# Patient Record
Sex: FEMALE | Race: WHITE
[De-identification: names, ages, dates, MRNs, and addresses within clinical notes are randomized per-mention and may not be internally consistent; named-entity substitution may affect disease eponyms.]

---

## 2018-08-15 NOTE — ED
Complex/Multi-Sys Presentation





- HPI Summary


HPI Summary: 





 This is millicent Palumbo documenting for attending Yuliet Schmid MD. 





This patient is a 33 year old F BIBA to [facility] with a chief complaint of right 

eye peripheral blurry vision that began at 1930. 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 

works as a CNA and 15 minutes later she began having slurred speech, aphasia, 

and a thick tongue. All sx resolved after 45 minutes. The patient rates the 

pain 0/10 in severity. Patient reports light headedness. Patient denies HA and 

trouble ambulating. When these occurred she saw the nurse at her work and she 

was given something to raise her blood sugar. No similar sx in the past. LNMP 

was one month ago and should begin in 3 days. Pt believes she had possible low 

blood glucose but no hx of  DM. 








- History Of Current Complaint


Chief Complaint: EDAltMentalStatus


Time Seen by Provider: 08/15/18 21:30


Hx Obtained From: Patient


Onset/Duration: Lasting Minutes - 45, Resolved


Timing: Intermittent, Lasting: - 45 min


Severity Currently: None


Severity Initially: Mild


Associated Signs And Symptoms: Positive: Other - blurred vision and trouble 

speaking





- Allergies/Home Medications


Allergies/Adverse Reactions: 


 Allergies











Allergy/AdvReac Type Severity Reaction Status Date / Time


 


sulfamethoxazole Allergy  Hives Verified 08/15/18 21:39





[From Bactrim]     


 


trimethoprim [From Bactrim] Allergy  Hives Verified 08/15/18 21:39














PMH/Surg Hx/FS Hx/Imm Hx


Endocrine/Hematology History: 


   Denies: Hx Blood Transfusions, Hx Diabetes


Cardiovascular History: 


   Denies: Hx Auto Implanted Cardiovert Defib, Hx Cardiomegaly, Hx Deep Vein 

Thrombosis


Respiratory History: 


   Denies: Hx Chronic Obstructive Pulmonary Disease (COPD), Hx Pneumonia


 History: 


   Denies: Hx Benign Prostatic Hyperplasia


Sensory History: 


   Denies: Hx Legally Blind, Hx Deafness


Opthamlomology History: 


   Denies: Hx Legally Blind


Psychiatric History: 


   Denies: Hx Community Mental Health Tx


Infectious Disease History: Yes


Infectious Disease History: 


   Denies: Traveled Outside the [place] in Last 30 Days





- Family History


Known Family History: Positive: Diabetes





- Social History


Occupation: Employed Full-time


Alcohol Use: None


Substance Use Type: Reports: None


Smoking Status (MU): Never Smoked Tobacco





Review of Systems


Positive: Blurred Vision - right eye 


Positive: Other - "thick tongue" 


Neurological: Negative - trouble ambulating , Other - light headedness 


Positive: Slurred Speech - w/ aphasia .  Negative: Headache


All Other Systems Reviewed And Are Negative: Yes





Physical Exam





- Summary


Physical Exam Summary: 





VITAL SIGNS: Reviewed.


GENERAL:  Patient is a well-developed and nourished FEMALE who is lying 

comfortable in the stretcher. Patient is not in any acute respiratory distress.


HEAD AND FACE: No signs of trauma. No ecchymosis, hematomas or skull 

depressions. No sinus tenderness.


EYES: PERRLA, EOMI x 2, No injected conjunctiva, no nystagmus.


EARS: Hearing grossly intact. Ear canals and tympanic membranes are within 

normal limits.


MOUTH: Oropharynx within normal limits.


NECK: Supple, trachea is midline, no adenopathy, no JVD, no carotid bruit, no c-

spine tenderness, neck with full ROM.


CHEST: Symmetric, no tenderness at palpation


LUNGS: Clear to auscultation bilaterally. No wheezing or crackles.


CVS: Regular rate and rhythm, S1 and S2 present, no murmurs or gallops 

appreciated.


ABDOMEN: Soft, non-tender. No signs of distention. No rebound no guarding, and 

no masses palpated. Bowel sounds are normal.


EXTREMITIES: FROM in all major joints, no edema, no cyanosis or clubbing.


NEURO: Alert and oriented x 3. No acute neurological deficits. Speech is normal 

and follows commands.


SKIN: Dry and warm





Triage Information Reviewed: Yes


Vital Signs On Initial Exam: 


 Initial Vitals











Temp Pulse Resp BP Pulse Ox


 


 98.9 F   89   16   128/86   100 


 


 08/15/18 21:22  08/15/18 21:22  08/15/18 21:22  08/15/18 21:22  08/15/18 21:22











Vital Signs Reviewed: Yes





Diagnostics





- Vital Signs


 Vital Signs











  Temp Pulse Resp BP Pulse Ox


 


 08/15/18 21:55   94  16  119/69  100


 


 08/15/18 21:25   93  15  128/86  100


 


 08/15/18 21:23   96    100


 


 08/15/18 21:22  98.9 F  89  16  128/86  100














- Laboratory


Result Diagrams: 


 08/15/18 22:29





 08/15/18 22:29


Lab Statement: Any lab studies that have been ordered have been reviewed, and 

results considered in the medical decision making process.





- CT


  ** CTA head


CT Interpretation Completed By: Radiologist - normal CT head ED physician has 

reviewed this radiology report.





  ** CT head


CT Interpretation Completed By: Radiologist - normal CT head ED physician has 

reviewed this radiology report.





- EKG


  ** 22:28


Cardiac Rate: Tachycardia


EKG Rhythm: Sinus Tachycardia - at 102 BPM


EKG Interpretation: Normal axis. Normal interval. No ischemic changes 





Complex Multi-Symp Course/Dx


Assessment/Plan: This patient is a 33 year old F BIBA to [facility] with a chief 

complaint of right eye peripheral blurry vision that began at 1930. Pt was at 

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 

speech, aphasia, and a thick tongue. All sx resolved after 45 minutes. The 

patient rates the pain 0/10 in severity. Patient reports light headedness. 

Patient denies HA and trouble ambulating. When these occurred she saw the nurse 

at her work and she was given something to raise her blood sugar. No similar sx 

in the past. LNMP was one month ago and should begin in 3 days. Pt believes she 

had possible low blood glucose but no hx of  DM.  An EKG reveals sinus 

tachycardia.  CT Head reveals, per radiologist, normal CT head.  Test results 

with no significant abnormalities. UA obtained.  In the ED course the patient 

was given ASA.  We discussed patient care with Dr. Romero and they have 

accepted the patient for admission.  Patient will be admitted for a TIA.  The 

patient is agreeable with this plan.





- Diagnoses


Provider Diagnoses: 


 TIA (transient ischemic attack)








- Physician Notifications


Discussed Care Of Patient With: Marina Romero


Time Discussed With Above Provider: 12:55


Instructed by Provider To: Admit As Inpatient





Discharge





- Sign-Out/Discharge


Documenting (check all that apply): Patient Departure - admitted 





- Discharge Plan


Condition: Fair


Disposition: ADMITTED TO [facility]


Referrals: 


Tasia Jalloh MD [Primary Care Provider] - 





Attestation Statement


Scribe Attestation: 





This is millicent Palumbo documenting for attending Yuliet Schmid MD. 








User Type: Provider with Scribe


Provider Attestation: The documentation recorded by the scribe accurately 

reflects the service I personally performed and the decisions made by me.

## 2018-08-15 NOTE — UC
Dizzy HPI


HPI Summary: 


The patient is a 32 y/o F presenting to [facility] c/o blurred vision and slurred 

speech with dizziness and lightheadedness at approximately 19:45 tonight. She 

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 

sudden onset of blurred vision in her right eye. Her left eye was alright, but 

then she was going to talk to someone else she was working with and was unable 

to speak correctly. She did not have any other weakness or numbness, CP, or SOB

, but she felt anxious. Her symptoms only last minutes as her vision and speech 

are back to normal. She has not had previous episodes with these symptoms. She 

has hx of anxiety. She does not take any medications. 





- History Of Current Complaint


Stated Complaint: BLURRED SPEECH,VISION BLURRED


Time Seen by Provider: 08/15/18 20:34


Hx Obtained From: Patient


Onset/Duration: Sudden Onset, Lasting Minutes, Resolved


Timing: Minutes


Pain Scale Used: 0-10 Numeric


Character: Lightheaded, Dizzy


Aggravating Factor(s): Nothing


Alleviating Factor(s): Nothing


Associated Signs And Symptoms: Positive: Negative - weakness, numbness, Visual 

Changes - blurred vision.  Negative: Chest Pain, SOB





- Allergies/Home Medications


Allergies/Adverse Reactions: 


 Allergies











Allergy/AdvReac Type Severity Reaction Status Date / Time


 


No Known Allergies Allergy   Verified 08/15/18 20:43











Home Medications: 


 Home Medications





NK [No Home Medications Reported]  08/15/18 [History Confirmed 08/15/18]











PMH/Surg Hx/FS Hx/Imm Hx


Other Endocrine History: NEGATIVE: diabetes


Other Cardiovascular History: NEGATIVE: HTN


Psychological History: Anxiety





- Family History


Known Family History: Positive: Diabetes





Review of Systems


Eyes: Blurred Vision - right eye


Respiratory: Other - NEGATIVE: SOB


Cardiovascular: Other - NEGATIVE: CP


Neurological: Other - POSITIVE: slurred speech, dizzy, lightheaded; NEGATIVE: 

weakness, numbness


Psychological: Anxious


All Other Systems Reviewed And Are Negative: Yes





Physical Exam





- Summary


Physical Exam Summary: 


General: well-appearing, no pain distress


Skin: warm, color reflects adequate perfusion, dry


Head: normal


Eyes: EOMI, CLARK


ENT: normal


Neck: supple, nontender


Respiratory: CTA, breath sounds present


Cardiovascular: RRR


Abdomen: soft, nontender


Bowel: present


Musculoskeletal: normal, strength/ROM intact


Neurological: sensory/motor intact, A&O x3


Psychological: affect/mood appropriate


Triage Information Reviewed: Yes


Vital Signs Reviewed: Yes





Diagnostics





- EKG


EKG Comments: 


20:29


Cardiac Rate: NL - 96 BPM


Cardiac Rhythm: Sinus: Normal


Ectopy: None


ST Segment: Normal





Dizzy Course/Dx





- Course


Course Of Treatment: Medications reviewed. Allergies noted.  fs bs 91.  TIME OF 

ONSET OF RIGHT SIDED BLURRED VISION AND DIFFICULTY OF SPEECH 19:45.  DURATION 

OF SX 15-20 MINUTES.  NO NEUROLOGIC DEFICIT IN CLINIC.  TRANSFERED BY AMBULANCE 

TO EMERGENCY DEPARTMENT.





- Differential Dx/Diagnosis


Provider Diagnoses: TRANSIENT BLURRED VISION AND EXPRESSIVE APHASIA





Discharge





- Sign-Out/Discharge


Documenting (check all that apply): Patient Departure - Patient will be 

transferred to [facility].





- Discharge Plan


Condition: Stable


Disposition: TRANS HIGHER LVL OF CARE FAC


Referrals: 


Tasia Jalloh MD [Primary Care Provider] - 





- Billing Disposition and Condition


Condition: STABLE


Disposition: Trans Higher Lvl of Care Fac





Attestation Statement


Scribe Attestation: 


This is millicent Denny documenting for attending Dr. Suresh Alvarenga MD.


User Type: Provider with Scribe


Provider Attestation: The documentation recorded by the scribe accurately 

reflects the service I personally performed and the decisions made by me.

## 2018-08-16 NOTE — HP
CC:  Dr. Tasia Jalloh*

 

HISTORY AND PHYSICAL:

 

DATE OF ADMISSION:  08/16/18

 

PRIMARY CARE PROVIDER:  To be established with Dr. Tasia Jalloh.

 

CHIEF COMPLAINT:  Difficulty with speech.

 

HISTORY OF PRESENT ILLNESS:  Ms. Hill is a 33-year-old female who has a 
history of morbid obesity with a BMI of greater than 50, who presents to the 
emergency room with complaints of blurry vision and difficult speech.  The 
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 
to bed when she noticed that she had blurriness out of the corner of her right 
eye.  She states the left eye was completely fine when she closed her right 
eye.  The patient states that she had like a crescent-shaped area of blurriness 
in the peripheral vision.  She states that she had LASIK surgery in December 2017 and thought perhaps it was just related to the dry eyes.  She tried some 
eyedrops, initially noted some slight improvement, but then the blurry vision 
came back.  The patient then states that when she was talking, she noted that 
her words were coming out jumbled.  The words themselves were not slurred, 
though when she tried to put sentences together, the words were out of order.  
The patient then began to feel anxious.  She states that when she feels anxious
, she will text with her mom, which typically calms her down.  When trying to 
text her mom, she noted that the same thing was happening with her words where 
they were out of order.  The patient found a nurse to check her vital signs. 
Her blood pressure was reportedly good at 122/65.  She thought perhaps it was 
related to hypoglycemia, so the nurse gave her some Ensure to drink.  The 
patient then was able to walk without any issues to urgent care.  She did feel 
mildly lightheaded, but she also states that she was feeling quite anxious by 
this time. The patient states that the onset of symptoms was around 7:30 p.m.  
She believes the symptoms in total lasted for about 45 minutes.  At this point, 
she is completely back to normal.

 

PAST MEDICAL HISTORY:  Anxiety/depression.

 

PAST SURGICAL HISTORY:  LASIK.

 

MEDICATIONS:  None.

 

ALLERGIES:  BACTRIM.

 

FAMILY HISTORY:  Mom is living.  She is 61, has a history of hyperlipidemia and 
psoriasis.  Dad is also living.  He is 59, he has a history of hypertension, 
hyperlipidemia, and trigeminal neuralgia.

 

SOCIAL HISTORY:  The patient is a nonsmoker.  She does not drink alcohol.  She 
does not use recreational drugs.  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  She is not 
. She has no children.  Her dad, Austin, is her healthcare proxy.

 

REVIEW OF SYSTEMS:  A complete 11-system review of systems is obtained.  
Pertinent positives and negatives are as per HPI and otherwise negative.

 

                               PHYSICAL EXAMINATION

 

GENERAL:  The patient is a well-developed, morbidly obese, young female, seen 
sitting up in the stretcher, in no acute distress.

 

VITAL SIGNS:  Blood pressure 117/71, pulse 103, respirations 19, temp 98.9, O2 
sat 97% on room air.

 

HEENT:  Pupils are equal and round.  Extraocular muscles are intact.  
Oropharynx is clear.  Oral mucosa is moist.

 

NECK:  There is no submandibular, cervical, or supraclavicular adenopathy.  
Thyroid is not enlarged.  No thyroid nodules are noted.

 

PULMONARY:  Lungs are clear to auscultation bilaterally.

 

CARDIAC:  Normal S1, S2.  Regular rate and rhythm.  I do not appreciate any 
murmurs.  There is no lower extremity edema.

 

ABDOMEN:  Bowel sounds are present.  Abdomen is obese, soft, nontender, and 
nondistended.

 

MUSCULOSKELETAL:  There is no cyanosis or clubbing of the digits.  There is 
full active range of motion of all 4 extremities.

 

SKIN:  Warm and dry.  There are no rashes.

 

NEUROLOGIC:  Cranial nerves II through XII are grossly intact.  Sensation is 
intact to light touch throughout.  Strength is 5/5 and symmetric in both upper 
and lower extremities bilaterally.  The patient's speech is clear and fluent.  
The patient is able to ambulate with a steady fluid gait.

 

PSYCH:  The patient is alert.  She is oriented x3.  Affect appears appropriate.

 

 DIAGNOSTIC STUDIES/LAB DATA:  Labs:  WBC 8.7, hemoglobin 13.5, hematocrit 40, 
platelets 399.  INR 1.02.  Sodium 136, potassium 3.6, chloride 105, CO2 of 24, 
BUN 14, creatinine 0.7, glucose 84, calcium 8.8.  Bilirubin 0.4, AST 19, ALT 21
, alk phos 93.  Troponin 0, albumin 4.3.  Urinalysis reveals specific gravity 
of 1.009 and trace leukocyte esterase.

 

EKG reveals normal sinus rhythm without any acute ST-T wave abnormality.  CTA 
head and neck reported to be normal.

 

ASSESSMENT AND PLAN:  Ms. Hill is a 33-year-old morbidly obese female with 
only a history of anxiety and depression, who presents to the emergency room 
with complaints of sudden onset of blurry vision via the right eye and aphasia.

 

1.  Possible transient ischemic attack.  The patient's symptoms of blurry 
vision and aphasia could represent transient ischemic attack; however, the 
patient is quite young for this.  The patient's vision only being affected by 
the right eye seems unlikely to be secondary to transient ischemic attack, 
however at this point the patient will be admitted and evaluated for such 
condition.  The patient did not get a plain CT of the brain prior to her CTA 
head and neck.  This will be obtained 6 hours after the CTA, which will be 
about 6:00 this morning.  This is due to the fact that the patient had 
contrast.  The patient will have a lipid panel and hemoglobin A1c obtained in 
the morning.  She will continue on a baby aspirin for now.  I will touch base 
with Neurology later this morning to ask about obtaining an MRI and to have a 
consultation performed.  Of note, the patient's blood sugar was only 84 in the 
emergency room.  She does state that she had an Ensure around 7:30 to 8:00 p.m.
  I would expect perhaps this would have been higher.  Perhaps she was 
hypoglycemic though when she arrived her urgent care, her blood sugar was 91 at 
that point.  The blood sugar of 91 makes hypoglycemia seem still the 
possibility as again she has had Ensure just right before then.  With the Ensure
, I would expect that her blood sugar to be higher if she started out at the 
normal range.

2.  Morbid obesity.  The patient states that she is working on diet and 
exercise. We will continue to encourage this.

3.  DVT prophylaxis.  According to Adult Thrombosis Prophylaxis Risk Assessment 
Guide, the patient has a total risk factor score of 1 making her low risk. 
Ambulation will be utilized to deep venous thrombosis prophylaxis.

4.  Code status is full.

 

TIME SPENT:  Fifty-five minutes was spent admitting this patient.

 

 

 

[id number]/CPS #: [id number]

DAVIS

## 2018-08-16 NOTE — PN
Subjective


Date of Service: 08/16/18


Interval History: 





Patient denies any complaints .  reports that vision has returned to baseline,  

no visual complaints at this time.  Denies HA or dizziness.  Denies chest pain 

or shortness of breath.  Denies abd pain.  n/v/d. denies fever or chills 


Family History: Unchanged from Admission


Social History: Unchanged from Admission


Past Medical History: Unchanged from Admission





Objective


Active Medications: 








Aspirin (Aspirin 81 Mg Chew Tab*)  81 mg PO DAILY MINESH








 Vital Signs - 8 hr











  08/16/18 08/16/18 08/16/18





  00:26 00:27 00:55


 


Temperature   


 


Pulse Rate 107 112 105


 


Respiratory  29 13





Rate   


 


Blood Pressure  135/84 122/77





(mmHg)   


 


O2 Sat by Pulse 96 96 98





Oximetry   














  08/16/18 08/16/18 08/16/18





  01:00 01:25 01:59


 


Temperature   


 


Pulse Rate 104 102 


 


Respiratory 15 19 23





Rate   


 


Blood Pressure  117/71 140/77





(mmHg)   


 


O2 Sat by Pulse 97 97 





Oximetry   














  08/16/18 08/16/18 08/16/18





  02:01 02:25 02:55


 


Temperature   


 


Pulse Rate 111 109 101


 


Respiratory 16 16 17





Rate   


 


Blood Pressure  126/84 108/65





(mmHg)   


 


O2 Sat by Pulse 97 96 96





Oximetry   














  08/16/18 08/16/18 08/16/18





  03:00 03:02 03:18


 


Temperature   98.6 F


 


Pulse Rate 92  101


 


Respiratory 21 18 18





Rate   


 


Blood Pressure   108/65





(mmHg)   


 


O2 Sat by Pulse 96  98





Oximetry   














  08/16/18 08/16/18 08/16/18





  03:39 05:02 07:07


 


Temperature 98.1 F  97.8 F


 


Pulse Rate 103  103


 


Respiratory 18  





Rate   


 


Blood Pressure 145/84  124/81





(mmHg)   


 


O2 Sat by Pulse 98 98 99





Oximetry   











Oxygen Devices in Use Now: None


Appearance: alert, sitting on the edge of the bed.  no acute distress


Eyes: No Scleral Icterus


Ears/Nose/Mouth/Throat: Clear Oropharnyx, Mucous Membranes Moist


Neck: NL Appearance and Movements; NL JVP, Trachea Midline


Respiratory: Symmetrical Chest Expansion and Respiratory Effort, Clear to 

Auscultation


Cardiovascular: NL Sounds; No Murmurs; No JVD, No Edema


Abdominal: NL Sounds; No Tenderness; No Distention


Extremities: No Edema, No Clubbing, Cyanosis


Skin: No Rash or Ulcers, No Nodules or Sclerosis


Neurological: Alert and Oriented x 3, - - cranial nerves 2- 12 intact , tongue 

midline,  smile equal , speech is clear


Nutrition: Taking PO's


Result Diagrams: 


 08/15/18 22:29





 08/15/18 22:29


Microbiology and Other Data: 


 Microbiology











 08/16/18 05:15 Nasal Screen MRSA (PCR) - Final





 Nasal    Mrsa Not Detected














Assess/Plan/Problems-Billing


Assessment: 











- Patient Problems


(1) Blurred vision, right eye


Status: Acute   Code(s): H53.8 - OTHER VISUAL DISTURBANCES   SNOMED Code(s): 

997212533


   Comment: 


-will continue workup to R/o TIA 


-CTA head - negative


-CT brain negative 


- MRI - negative 


-Continue ASA 


Neurology Consulted - VEP - study completed - WNL


recommeded Hypercoag panel ordered and obtained prior to discharge


will start on statin at discharge 


will need follow up with neurology in 2-3 weeks 


nutrition consult as an outpatient 


will need evaluation for sleep apnea 


   





(2) Anxiety


Status: Acute   Code(s): F41.9 - ANXIETY DISORDER, UNSPECIFIED   SNOMED Code(s)

: 39792706


   Comment: 


Not currently on medications


continue supportive care    





(3) Obesity


Status: Acute   Code(s): E66.9 - OBESITY, UNSPECIFIED   SNOMED Code(s): 

362986326


   Comment: nutrition consult as an outpatient   





(4) DVT prophylaxis


Status: Acute   Code(s): IJT6645 -    SNOMED Code(s): 994162704


   Comment: 


ambulation    





(5) Full code status


Status: Acute   Code(s): Z78.9 - OTHER SPECIFIED HEALTH STATUS   SNOMED Code(s)

: [id number]


   


Status and Disposition: 





discharge

## 2018-08-16 NOTE — RAD
INDICATION:  TIA.



COMPARISON:  Comparison is made with a prior CT of the brain from August 16, 2018.



TECHNIQUE: Sagittal T1, axial T1, T2, susceptibility, FLAIR and diffusion weighted images

were obtained.



FINDINGS: The ventricles, cisterns and sulci appear to be within normal limits.   No

significant focal abnormality or mass effect is seen.  



No areas of restricted diffusion are present.  There is no evidence for infarct or

hemorrhage.



The visualized portion of the paranasal sinuses and mastoid air cells appear clear.



IMPRESSION:  NEGATIVE EXAM.

## 2018-08-16 NOTE — RAD
EXAM:

  CT Head Without Intravenous Contrast



CLINICAL HISTORY:

  33 years old, female; Signs and symptoms; Weakness, extremity; Right; 

Additional info: ? TIA



TECHNIQUE:

  Axial computed tomography images of the head/brain without intravenous 

contrast.



COMPARISON:

  CTA HD/NK CTA HEAD/NECK 2018-08-15 23:33



FINDINGS:

  Brain:  Unremarkable.  No hemorrhage.  No significant white matter disease.  

No edema.

  Ventricles:  Unremarkable.  No ventriculomegaly.

  Bones/joints:  Unremarkable.  No acute fracture.

  Soft tissues:  Unremarkable.

  Sinuses:  Unremarkable as visualized.  No acute sinusitis.

  Mastoid air cells:  Unremarkable as visualized.  No mastoid effusion.



IMPRESSION:     

No evidence for acute intracranial abnormality.

## 2018-08-16 NOTE — RAD
EXAM:

  CT Angiography Head With Intravenous Contrast



CLINICAL HISTORY:

  33 years old, female; Signs and symptoms; Speech disturbance and weakness; 

Slurred speech; Additional info: TIA



TECHNIQUE:

  Axial computed tomographic angiography images of the head with intravenous 

contrast using CT angiography protocol.

  3D and MIP reconstructed images were created and reviewed.



COMPARISON:

  No relevant prior studies available.



FINDINGS:

  Right internal carotid artery:  No acute findings.  Intracranial segment is 

patent with no significant stenosis.  No aneurysm.

  Right anterior cerebral artery:  Unremarkable.  No occlusion or significant 

stenosis.  No aneurysm.

  Right middle cerebral artery:  Unremarkable.  No occlusion or significant 

stenosis.  No aneurysm.

  Right posterior cerebral artery:  Unremarkable.  No occlusion or significant 

stenosis.  No aneurysm.

  Right vertebral artery:  Unremarkable as visualized.



  Left internal carotid artery:  No acute findings.  Intracranial segment is 

patent with no significant stenosis.  No aneurysm.

  Left anterior cerebral artery:  Unremarkable.  No occlusion or significant 

stenosis.  No aneurysm.

  Left middle cerebral artery:  Unremarkable.  No occlusion or significant 

stenosis.  No aneurysm.

  Left posterior cerebral artery:  Unremarkable.  No occlusion or significant 

stenosis.  No aneurysm.

  Left vertebral artery:  Unremarkable as visualized.



  Basilar artery:  Unremarkable.  No occlusion or significant stenosis.  No 

aneurysm.



IMPRESSION:     

  Normal head CTA.



_______________________________________________



EXAM:

  CT Angiography Neck With Intravenous Contrast



CLINICAL HISTORY:

  33 years old, female; Signs and symptoms; Speech disturbance and weakness; 

Slurred speech; Additional info: TIA



TECHNIQUE:

  Axial computed tomographic angiography images of the neck with intravenous 

contrast using CT angiography protocol.

  3D and MIP reconstructed images were created and reviewed.



COMPARISON:

  No relevant prior studies available.



FINDINGS:



 VASCULATURE:

  Right common carotid artery:  Unremarkable.  No significant stenosis.  No 

dissection or occlusion.

  Right internal carotid artery:  Unremarkable.  Extracranial segment is patent 

with no significant stenosis.  No dissection or occlusion.

  Right external carotid artery:  Unremarkable.  No occlusion.

  Right vertebral artery:  Unremarkable.  No significant stenosis.  No 

dissection or occlusion.



  Left common carotid artery:  Unremarkable.  No significant stenosis.  No 

dissection or occlusion.

  Left internal carotid artery:  Unremarkable.  Extracranial segment is patent 

with no significant stenosis.  No dissection or occlusion.

  Left external carotid artery:  Unremarkable.  No occlusion.

  Left vertebral artery:  Unremarkable.  No significant stenosis.  No 

dissection or occlusion.



 NECK:

  Bones/joints:  No acute fracture.  No dislocation.

  Soft tissues:  Unremarkable as visualized.  No mass.



 CAROTID STENOSIS REFERENCE USING NASCET CRITERIA:

  % ICA stenosis = (1 - narrowest ICA diameter/diameter of distal cervical ICA) 

x 100.

  Mild - <50% stenosis.

  Moderate - 50-69% stenosis.

  Severe - 70-94% stenosis.

  Near occlusion - 95-99% stenosis.

  Occluded - 100% stenosis.



IMPRESSION:     

  Normal neck CTA.



I have reviewed the images and the accompanying report and agree with the findings.



Additionally the left lobe of the thyroid demonstrates a hypodense nodule in the lower

pole. The lung apices demonstrate no focal nodules. Scattered lymph nodes are noted along

the soft tissues of the neck.



Images of the CTA of the head was reviewed and I essentially agree with the report

provided above.

## 2018-08-16 NOTE — ECHO
Patient:      INNA ESCOBEDO  

Med Rec#:     [id number]            :          1985          

Date:         2018            Age:          33y                 

Account#:     [id number]          Height:       168 cm / 66.1 in

Accession#:   [id number]           Weight:       151 kg / 332.8 lbs

Sex:          F                     BSA:          2.49

Room#:        [facility]                

Admit Date#:  2018          

Type:         Inpatient

 

Referring:    Marina Romero DO

Reading:      Carlos Eduardo Ureña DO

Sonographer:  Carlene Espinoza RDCS,RDMS

CC:           Tasia Jalloh MD

______________________________________________________________________

 

Transthoracic Echocardiogram

 

Indication:

TIA

BP:           145/84

HR:           78

Rhythm:       NSR

 

Findings     

History:

Previously healthy 

 

Technical Comments:

The study quality is fair.  

 

Left Ventricle:

The left ventricular chamber size is normal. There is no left

ventricular hypertrophy.   Global left ventricular wall motion and

contractility are within normal limits. There is normal left ventricular

systolic function. The estimated ejection fraction is 60-65%.  Normal

left ventricular diastolic filling is observed. 

 

Left Atrium:

The left atrial chamber size is normal. 

 

Right Ventricle:

The right ventricular chamber size and systolic function are within

normal limits. 

 

Right Atrium:

The right atrial cavity size is normal. 

 

Aortic Valve:

The aortic valve is trileaflet. Systolic excursion of the aortic valve

is normal. There is no evidence of aortic regurgitation. There is no

evidence of aortic stenosis. 

 

Mitral Valve:

The mitral valve leaflets appear normal. There is no evidence of mitral

regurgitation. There is no evidence of mitral stenosis. 

 

Tricuspid Valve:

The tricuspid valve leaflets are normal.  There is trace tricuspid

regurgitation.  Unable to estimate the right ventricular systolic

pressure.   

 

Pulmonic Valve:

The pulmonic valve structure is not well visualized. There is no

evidence of pulmonic valve thickening. There is no evidence of pulmonic

regurgitation. 

 

Pericardium:

There is no significant pericardial effusion. 

 

Aorta:

The aortic root appears normal. There is no dilatation of the aortic

arch. 

 

Pulmonary Artery:

The main pulmonary artery is not well visualized. 

 

Venous:

The inferior vena cava appears normal in size. There is a greater than

50% respiratory change in the inferior vena cava dimension. 

 

Contrast:

Definity was used to optimize study.  A total of 2 ml was used 

 

Conclusions

The left ventricular chamber size is normal.

There is no left ventricular hypertrophy.  

Global left ventricular wall motion and contractility are within normal

limits.

There is normal left ventricular systolic function.

The estimated ejection fraction is 60-65%. 

The left atrial chamber size is normal.

The right ventricular chamber size and systolic function are within

normal limits.

No significant valvular abnormalities noted

Definity was used to optimize study. 

 

None prior for comparison at time of interpretation  

 

Measurements     

Name                    Value         Normal Range            

RVIDd (AP) 2D           2.4 cm        (0.9 - 2.6)             

RVDdMajor (2D)          3 cm          (2.2 - 4.4)             

RAd ISD 4CH             5.2 cm        (3.4 - 4.9)             

RA (A4C)W               3.5 cm        (2.9 - 4.6)             

IVSd (2D)               1 cm          (0.6 - 1)               

LVPWd (2D)              1 cm          (0.6 - 1)               

LVIDd (2D)              4.9 cm        (3.6 - 5.4)             

LVIDs (2D)              3.4 cm        -                        

LV FS (2D)              31 %          (25 - 45)               

Aortic Annulus          2 cm          (1.4 - 2.6)             

Ao root diameter (2D)   2.7 cm        (2.1 - 3.5)             

Ascending Ao            2.6 cm        (2.1 - 3.4)             

Aortic arch             2.2 cm        (1.8 - 3.4)             

LA dimension (AP) 2D    3.8 cm        (2.3 - 3.8)             

LAd ISD 4CH             5.5 cm        (2.9 - 5.3)             

LA ISD 4CH W            3.5 cm        (2.5 - 4.5)             

 

Name                    Value         Normal Range            

LA ESV BP (A/L) index   19 ml/m2      -                        

 

Name                    Value         Normal Range            

MV E-wave Vmax          1 m/sec       -                        

MV deceleration time    193 msec      -                        

MV A-wave Vmax          0.6 m/sec     -                        

MV E:A ratio            1.9 ratio     -                        

LV septal e' Vmax       0.12 m/sec    -                        

LV lateral e' Vmax      0.2 m/sec     -                        

LV E:e' septal ratio    9 ratio       -                        

LV E:e' lateral ratio   5 ratio       -                        

 

Name                    Value         Normal Range            

AV Vmax                 1.5 m/sec     -                        

AV VTI                  26 cm         -                        

AV peak gradient        9 mmHg        -                        

AV mean gradient        5 mmHg        -                        

LVOT Vmax               1 m/sec       -                        

LVOT VTI                20 cm         -                        

LVOT peak gradient      4 mmHg        -                        

LVOT mean gradient      2 mmHg        -                        

NATHALY Vmax                1.2 m/sec     -                        

 

Name                    Value         Normal Range            

RAP                     8 mmHg        -                        

IVC diameter            2.1 cm        -                        

 

Name                    Value         Normal Range            

PV Vmax                 0.9 m/sec     -                        

PV peak gradient        3.2 mmHg      -                        

 

Electronically signed by: Carlos Eduardo Ureña DO on 2018 11:05:33

## 2018-08-18 NOTE — DS
DISCHARGE SUMMARY:

 

DATE OF ADMISSION:  08/16/18

 

DATE OF DISCHARGE:  08/16/18

 

ATTENDING PHYSICIAN:  Samaria Hobson MD * (dictated by Myrna Richards NP)

 

PRIMARY CARE PROVIDER:  Dr. Tasia Jalloh

 

PRIMARY DIAGNOSES:

1.  Possible transient ischemic attack.

2.  Blurred vision.

 

SECONDARY DIAGNOSIS:  Morbid obesity.

 

STUDIES COMPLETED WHILE IN THE HOSPITAL:  She had a CTA of the head on 05/15/
18. Radiologist impression:  Normal CTA of the head.  She had a CT of the brain 
on 08/16/18.  Radiologist impression:  No evidence of acute intracranial 
abnormality. She had a transthoracic echocardiogram on 08/16/18.  Conclusion:  
The left ventricular chamber size is normal.  There is no left ventricular 
hypertrophy. Global ventricular wall motion and contractility are within normal 
limits.  There is normal left ventricular systolic function.  The estimated 
ejection fraction is 60% to 65%.  The left atrial chamber size is normal.  The 
right ventricular chamber size and systolic function is within normal limits.  

No significant valvular abnormalities are noted.  She had an MRI of the brain 
on 08/16/18, which was negative.

 

DISCHARGE MEDICATIONS: New home medications:

1.  Aspirin 81 mg p.o. daily.

2.  Atorvastatin 20 mg p.o. daily.

 

HISTORY OF PRESENT ILLNESS AND HOSPITAL COURSE:  Ms. Hill is a 33-year-old 
female with a past medical history significant for morbid obesity with BMI of 
greater than 50, who presented to the emergency room with complaints of blurred 
vision and difficulty with speech.  The patient states that she was working at 
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 
had blurriness out of the corner of her right eye.  She said that the left eye 
was completely fine.  She closed her right eye.  The patient states that there 
was like a crescent shaped area of blurriness in the peripheral vision.  She 
states that she also had LASIK surgery in December 2017 and thought perhaps 
that was just due to dry eyes.  She tried some eye drops, initially noted some 
improvement, but then the blurry vision came back.  The patient states that 
when she was talking she noted her words were jumbled.  The words themselves 
were not slurred, but though when she tried to put sentences together, the 
words were out of order.  She then started to feel anxious.  Generally when she 
feels anxious, she texts her mom.  Her symptoms did not improve.  She did seek 
the nurse and had vital signs taken at [place].  Her vital signs were good.  
Her blood pressure at that time was 122/65.  She was able to walk without any 
issues and went to Urgent Care.  She did feel mildly lightheaded, but states 
that she was also feeling very anxious at that time.  She reports that the 
onset of these symptoms occurred at 07:30 a.m. on the day of admission and 
believes that the symptoms lasted approximately 45 minutes.  At the point of 
exam in the emergency room, her symptoms had totally resolved.

 

While in the hospital, she had an MRI of the brain, a CTA of the head, a CT of 
the brain.  She had a VEP study that was read by Dr. Velasquez and verbal 
communication was within normal limits.  She was monitored on telemetry.  She 
had no arrhythmias noted on the monitor.  On the day of discharge, she is alert 
and oriented.  She has no visual complaints or blurriness in her peripheral 
vision.  She feels that she is at baseline.  Her speech is clear.  She is able 
to articulate her words without difficulty.  She was seen and consulted also by 
Neurology during this hospitalization who felt that her symptoms were 
consistent with a TIA and recommended that she be placed on a statin and 
aspirin and to follow up with Neurology in 3 to 4 weeks.

 

At this point, Ms. Hill is stable for discharge home.  Vital signs are as 
follows:  Temperature was 97.7, heart rate is 82, respiration 12, O2 saturation 
was 99%.  Blood pressure was 147/97.

 

DISCHARGE PLAN: Ms. Hill will be discharged home.  Activity as tolerated.

 

1.  Possible TIA.  She should continue on aspirin 81 mg p.o. daily and 
atorvastatin 20 mg p.o. daily.  She should follow up with Neurology in 3 to 4 
weeks.  She should not drive until she is cleared by her primary care provider.
  She should follow up with her primary care provider  in 4 to 7 days.  She 
should also have an outpatient sleep study referral and a referral to a 
dietician.  She is to follow up with Dr. Velasquez in 2 to 3 weeks.  She needs to 
call the office for an appointment.  She needs to follow up with her primary 
eye doctor this week for an ophthalmology exam.

2.  Obesity.  She should follow up with her primary care provider for referral 
to nutritionist/dietician for further help and management of her weight.



  Followup.  The patient should follow up with her primary care provider  in 4 
to 7 days.  She should follow up with Dr. Velasquez, neurologist in 2 to 3 weeks.
  She needs to call the office for an appointment.

 

This is a summarization of her hospitalization.  If further details are needed, 
please obtain the entire medical record.

 

The patient was instructed to return to the emergency room for any chest pain, 
headaches, slurred speech, weakness on 1 side or any other worsening symptoms.

 

TIME SPENT:  Time spent on this discharge was approximately 60 minutes, greater 
than half that time was spent with the patient discussing discharge plans and 
instructions.

 

CONDITION ON DISCHARGE:  Stable.

 

I have discussed with my attending, Dr. Samaria Hobson, she is in agreement 
with my plan.

 

____________________________________ MYRNA RICHARDS, MAHESH

 

[id number] #: [id number]

[id number]

## 2018-09-05 NOTE — XMS REPORT
Continuity of Care Document (CCD)

 Created on:2018



Patient:Inna Hill

Sex:Female

:1985

External Reference #:[id number]





Demographics







 Address  [address] [place] [address]

 

 Home Phone  [phone]

 

 Preferred Language  Unknown

 

 Marital Status  Unknown

 

 [employer] Affiliation  Unknown

 

 Race  Unknown

 

 Ethnic Group  Unknown









Author







 Name  Deedee Ortiz









Care Team Providers







 Name  Role  Phone

 

 Tasia Jalloh MD  Care Team Information   Unavailable

 

 Tasia Jalloh MD  Primary Care Physician  Unavailable









Payers







 Type  Date  Identification Numbers  Payment Provider  Subscriber

 

     Policy Number: [id number]  [employer]  Inna Hill









 PayID: [id number]  [address] [place] [address]







Advance Directives







 Description

 

 No Information Available







Problems







 Description

 

 No Information







Family History







 Date  Family Member(s)  Problem(s)  Comments

 

   Father  Hypertension  

 

   Father  Glaucoma  

 

   Father  Trigenminal  neuralgia  

 

   Mother  Hyperlipidemia  

 

   Mother  2 heart valves repaired  

 

   First Brother  No Current Problems  

 

   First Sister  No Current Problems  

 

   Second Sister  No Current Problems  

 

   Third Sister  No Current Problems  







Social History







 Type  Date  Description  Comments

 

 Birth Sex    Unknown  

 

 ETOH Use    Denies alcohol use  

 

 Tobacco Use  Start: Unknown  Patient has never smoked  







Allergies, Adverse Reactions, Alerts







 Date  Description  Reaction  Status  Severity  Comments

 

 2018  Bactrim    Active    







Medications







 Medication  Date  Status  Form  Strength  Qnty  SIG  Indications  Ordering



                 Provider

 

 Atorvastatin    Active  Tablets  20mg  90tabs  1 by    Tasia Jalloh,



 Calcium  018          mouth    MD



             evening    

 

 Aspirin    Active  Chewtabs  81mg  90units  1 by    Tasia Jalloh



 Childrens  018          mouth    MD



             every day    







Immunizations







 Description

 

 No Information Available







Vital Signs







 Date  Vital  Result  Comment

 

 2018  4:09pm  BP Systolic  140 mmHg  









 BP Diastolic  82 mmHg  

 

 Height  66 inches  5'6"

 

 Weight  329.00 lb  

 

 BMI (Body Mass Index)  53.1 kg/m2  







Results







 Test  Date  Facility  Test  Result  H/L  Range  Note

 

 CBC Auto Diff  08/15/2018  [facility]  White Blood  8.7 10^3/uL    
3.5-10.8  



     [address]  Count        



     [place], [place] [id number] [phone]          









 Red Blood Count  4.41 10^6/uL    4.00-5.40  

 

 Hemoglobin  13.5 g/dL    12.0-16.0  

 

 Hematocrit  40 %    35-47  

 

 Mean Corpuscular Volume  90 fL    80-97  

 

 Mean Corpuscular Hemoglobin  31 pg    27-31  

 

 Mean Corpuscular HGB Conc  34 g/dL    31-36  

 

 Red Cell Distribution Width  13 %    10.5-15  

 

 Platelet Count  399 10^3/uL    150-450  

 

 Mean Platelet Volume  8.0 um3    7.4-10.4  

 

 Abs Neutrophils  5.0 10^3/uL    1.5-7.7  

 

 Abs Lymphocytes  2.7 10^3/uL    1.0-4.8  

 

 Abs Monocytes  0.6 10^3/uL    0-0.8  

 

 Abs Eosinophils  0.4 10^3/uL    0-0.6  

 

 Abs Basophils  0.1 10^3/uL    0-0.2  

 

 Abs Nucleated RBC  0 10^3/uL      

 

 Granulocyte %  57.2 %    38-83  

 

 Lymphocyte %  31.0 %    25-47  

 

 Monocyte %  6.6 %    0-7  

 

 Eosinophil %  4.4 %    0-6  

 

 Basophil %  0.8 %    0-2  

 

 Nucleated Red Blood Cells %  0      









 Comp Metabolic Panel  08/15/2018  [facility]  Sodium  136 mmol/L    
135-145  



     [address] [place] [address] [phone]          









 Potassium  3.6 mmol/L    3.5-5.0  

 

 Chloride  105 mmol/L    101-111  

 

 Co2 Carbon Dioxide  24 mmol/L    22-32  

 

 Anion Gap  7 mmol/L    2-11  

 

 Glucose  84 mg/dL      

 

 Blood Urea Nitrogen  14 mg/dL    6-24  

 

 Creatinine  0.70 mg/dL    0.51-0.95  

 

 BUN/Creatinine Ratio  20.0    8-20  

 

 Calcium  8.8 mg/dL    8.6-10.3  

 

 Total Protein  7.4 g/dL    6.4-8.9  

 

 Albumin  4.3 g/dL    3.2-5.2  

 

 Globulin  3.1 g/dL    2-4  

 

 Albumin/Globulin Ratio  1.4    1-3  

 

 Total Bilirubin  0.40 mg/dL    0.2-1.0  

 

 Alkaline Phosphatase  93 U/L      

 

 Alt  21 U/L    7-52  

 

 Ast  19 U/L    13-39  

 

 Egfr Non-  97.6    >60  

 

 Egfr   118.1    >60  1









 Laboratory test  08/15/2018  [facility]  Troponin I  0.00 ng/mL    <
0.04  



 finding    [address] [place] [address]          



     [phone]          









 HCG Pregnancy  < 0.60 mIU/mL      2









 Inr/Protime  08/15/2018  [facility]  Inr  1.02    0.77-1.02  



     [facility] [place] [address]          



     [phone]          

 

 Laboratory test  08/15/2018  [facility]  Activated  32.8    26.0-
36.3  



 finding    [facility]  Partial  seconds      



     [place] [address]  [employer] [facility]        



     [phone]          

 

 Urinalysis  08/15/2018  [facility]  Urine Color  Yellow      



 Profile    [facility] [address] [place] [address]          



     [phone]          









 Urine Appearance  Clear      

 

 Urine Specific Gravity  1.009  Low  1.010-1.030  

 

 Urine pH  5.0    5-9  

 

 Urine Urobilinogen  Negative    Negative  

 

 Urine Ketones  Negative    Negative  

 

 Urine Protein  Negative    Negative  

 

 Urine Leukocytes  Trace    Negative  

 

 Urine Blood  Negative    Negative  

 

 Urine Nitrite  Negative    Negative  

 

 Urine Bilirubin  Negative    Negative  

 

 Urine Glucose  Negative    Negative  

 

 Urine White Blood Cell  Trace(0-5/hpf)    Absent  

 

 Urine Red Blood Cell  Trace(0-2/hpf)    Absent  

 

 Urine Bacteria  Absent    Absent  

 

 Urine Squamous Epithelial Cell  Present    Absent  









 Laboratory test  08/15/2018  [facility]  TSH (Thyroid  2.61    0.34-
5.60  



 finding    [facility]  Stim Horm)  mcIU/mL      



     [place] [address]          



     [phone]          

 

 Urine Culture And  08/15/2018  [facility]  Urine  SEE RESULT      3



 Sensitivities    [facility]  Culture  BEL[place]      



     [place] [address]          



     [phone]          

 

 Laboratory test  08/15/2018  [facility]  Point of  91 mg/dL    70-
100  4



 finding    [facility]  Care Glucose        



     [place] [address]          



     [phone]          









 1  *******Because ethnic data is not always readily available,



   this report includes an eGFR for both -Americans and



   non- Americans.****



   The National Kidney Disease Education Program (NKDEP) does



   not endorse the use of the MDRD equation for patients that



   are not between the ages of 18 and 70, are pregnant, have



   extremes of body size, muscle mass, or nutritional status,



   or are non- or non-.



   According to the National Kidney Foundation, irrespective of



   diagnosis, the stage of the disease is based on the level of



   kidney function:



   Stage Description                      GFR(mL/min/1.73 m(2))



   1     Kidney damage with normal or decreased GFR       90



   2     Kidney damage with mild decrease in GFR          60-89



   3     Moderate decrease in GFR                         30-59



   4     Severe decrease in GFR                           15-29



   5     Kidney failure                       <15 (or dialysis)

 

 2  <5.0 Negative



   



   5.0 - 25.0  Indeterminate (Repeat testing recommended after



   72 hours)



   >25.0  Positive



   



   Perimenopausal women can display HCG levels of up to 20



   mIU/mL

 

 3  SEE RESULT BELOW



   -----------------------------------------------------------------------------
---------------



   Name:  INNA HILL             : 1985    Attend Dr: Marina Romero DO



   Acct:  [id number]  Unit: [id number]  AGE: 33            Location:  [place] [address]



   Re18      Dis:  18    SEX: F             Status:    DIS Jd



   -----------------------------------------------------------------------------
---------------



   



   SPEC: [id number]         ALIRIO:   08/15/18-    [facility] DR: Yuliet Schmid MD



   REQ:  [id number]              RECD:   08/15/



   STATUS: ANGELI GONCALVES DR: Tasia Jalloh MD



   _



   SOURCE: URINE          SPDESC:



   ORDERED:  Urine Culture



   



   -----------------------------------------------------------------------------
---------------



   Procedure                         Result                         Reported   
        Site



   -----------------------------------------------------------------------------
---------------



   Urine Culture  Final                                             18-
826      ML



   



   No growth of clinically significant organisms



   



   -----------------------------------------------------------------------------
---------------



   * ML - Main Lab



   .



   



   



   



   



   



   



   



   



   



   



   



   



   



   



   



   



   



   



   



   



   



   



   



   



   



   



   ** END OF REPORT **



   



   DEPARTMENT OF PATHOLOGY,  [address] [place] [address]



   Phone # [phone]      Fax #[phone]



   Federico Ortega M.D. Director     [facility] # [id number]

 

 4  : [id number]







Procedures







 Description

 

 No Information Available







Encounters







 Type  Date  Location  Provider  Dx  Diagnosis

 

 Office Visit  2018  Main Office  Tasia Jalloh MD  G43.B0  Ophthalmoplegic 
migraine,



   4:15p        not intractable









 E66.01  Morbid (severe) obesity due to excess calories

 

 M15.9  Polyosteoarthritis, unspecified







Plan of Treatment

Future Appointment(s):09/10/2018  8:00 am - Nurse at Main [id number]  8:
30 am - Tasia Jalloh MD at Main Office

## 2018-09-05 NOTE — ED
Throat Pain/Nasal Congestion





- HPI Summary


HPI Summary: 





33 yr old female.  She has no eye pain or recollection of injury to the eye.  

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  The patient 

states her cat sleeps near her, and sometimes rubs side of her face, but 

patient doesn't recall getting rubbed or poked by cat claw.  She has no pain in 

the eye. 





The patient noticed a red spot on the right lateral eye today and came to have 

her eye looked at.  





She has not been coughing or sneezing hard





- History of Current Complaint


Chief Complaint: UCEye


Time Seen by Provider: 09/05/18 13:14





- Allergies/Home Medications


Allergies/Adverse Reactions: 


 Allergies











Allergy/AdvReac Type Severity Reaction Status Date / Time


 


sulfamethoxazole Allergy  Hives Verified 09/05/18 12:57





[From Bactrim]     


 


trimethoprim [From Bactrim] Allergy  Hives Verified 09/05/18 12:57


 


STEROID EYE DROPS Allergy Unknown CAUSED Uncoded 09/05/18 12:57





   EYEPRESSURE  





   TO GO UP,  





   BLURRED  





   VISION.  











Home Medications: 


 Home Medications





Acetaminophen [Pain Relief] 1,000 mg PO PRN 09/05/18 [History]


Ibuprofen TAB* [Advil TAB*] 800 mg PO Q6H PRN 09/05/18 [History Confirmed 09/05/ 18]











PMH/Surg Hx/FS Hx/Imm Hx


Endocrine/Hematology History: 


   Denies: Hx Blood Transfusions, Hx Diabetes


Cardiovascular History: 


   Denies: Hx Auto Implanted Cardiovert Defib, Hx Cardiomegaly, Hx Deep Vein 

Thrombosis


Respiratory History: Reports: Other Respiratory Problems/Disorders - Bronchitis


   Denies: Hx Chronic Obstructive Pulmonary Disease (COPD), Hx Pneumonia


 History: 


   Denies: Hx Benign Prostatic Hyperplasia


Sensory History: 


   Denies: Hx Contacts or Glasses, Hx Legally Blind, Hx Deafness, Hx Hearing Aid


Opthamlomology History: 


   Denies: Hx Contacts or Glasses, Hx Legally Blind


Psychiatric History: Reports: Hx Anxiety


   Denies: Hx [facility] Tx





- Surgical History


Surgery Procedure, Year, and Place: Lasik Dec 14, 2017


Infectious Disease History: Yes


Infectious Disease History: Reports: Hx of Known/Suspected MRSA


   Denies: Traveled Outside the [place] in Last 30 Days





- Family History


Known Family History: Positive: Diabetes





- Social History


Occupation: Employed Full-time


Alcohol Use: None


Substance Use Type: Reports: None


Smoking Status (MU): Never Smoked Tobacco





Review of Systems


Constitutional: Negative


Positive: Other - no pain.  Negative: Photophobia, Blurred Vision, Diplopia, 

Drainage


All Other Systems Reviewed And Are Negative: Yes





Physical Exam


Triage Information Reviewed: Yes


Vital Signs On Initial Exam: 


 Initial Vitals











Temp Pulse Resp BP Pulse Ox


 


 98.4 F   78   20   137/74   98 


 


 09/05/18 13:00  09/05/18 13:00  09/05/18 13:00  09/05/18 13:00  09/05/18 13:00











Vital Signs Reviewed: Yes


Appearance: Positive: Well-Appearing, No Pain Distress


Skin: Positive: Warm, Skin Color Reflects Adequate Perfusion


Head/Face: Positive: Normal Head/Face Inspection


Eyes: Positive: EOMI, CLARK, Other: - under wood lamp and with flourscein.  No 

uptate of any flouroscein on the cornea or over sclera.   She has a small 

subconjunctival hemmorhage present over the lateral right eye..  Negative: 

Conjunctiva Inflammed, Discharge


ENT: Positive: Pharynx normal.  Negative: Nasal congestion, Nasal drainage


Respiratory/Lung Sounds: Positive: Other - normal effort


Cardiovascular: Positive: Pulses are Symmetrical in both Upper and Lower 

Extremities


Abdomen Description: Negative: Distended


Musculoskeletal: Positive: Strength/ROM Intact


Neurological: Positive: Sensory/Motor Intact, Alert, Oriented to Person Place, 

Time, CN Intact II-III


Psychiatric: Positive: Normal





- Cindi Coma Scale


Best Eye Response: 4 - Spontaneous


Best Motor Response: 6 - Obeys Commands


Best Verbal Response: 5 - Oriented


Coma Scale Total: 15





Diagnostics





- Vital Signs


 Vital Signs











  Temp Pulse Resp BP Pulse Ox


 


 09/05/18 13:00  98.4 F  78  20  137/74  98














- Laboratory


Lab Statement: Any lab studies that have been ordered have been reviewed, and 

results considered in the medical decision making process.





EENT Course/Dx





- Course


Course Of Treatment: 33 yr old with subconjuntival hemmorhage.  Plan DC home.  

FU with referral to Optho.  No evidence of scratch or laceration to the sclera 

right eye.





- Diagnoses


Provider Diagnoses: 


 Subconjunctival hemorrhage of right eye








Discharge





- Sign-Out/Discharge


Documenting (check all that apply): Patient Departure


All imaging exams completed and their final reports reviewed: No Studies





- Discharge Plan


Condition: Good


Disposition: HOME


Patient Education Materials:  Subconjunctival Hemorrhage (ED)


Referrals: 


Tasia Jalloh MD [Primary Care Provider] - 2 Days


Nighat García MD [Medical Doctor] - 2 Days





- Billing Disposition and Condition


Condition: GOOD


Disposition: Home

## 2018-12-01 ENCOUNTER — HOSPITAL ENCOUNTER (EMERGENCY)
Dept: HOSPITAL 25 - UCCORT | Age: 33
Discharge: HOME | End: 2018-12-01
Payer: COMMERCIAL

## 2018-12-01 VITALS — DIASTOLIC BLOOD PRESSURE: 86 MMHG | SYSTOLIC BLOOD PRESSURE: 117 MMHG

## 2018-12-01 DIAGNOSIS — L50.9: ICD-10-CM

## 2018-12-01 DIAGNOSIS — Z88.1: ICD-10-CM
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
## 2018-12-01 DIAGNOSIS — S40.861A: ICD-10-CM

## 2018-12-01 DIAGNOSIS — S40.862A: Primary | ICD-10-CM
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
## 2018-12-01 DIAGNOSIS — S80.861A: ICD-10-CM

## 2018-12-01 DIAGNOSIS — S80.862A: ICD-10-CM

## 2018-12-01 PROCEDURE — 99212 OFFICE O/P EST SF 10 MIN: CPT

## 2018-12-01 PROCEDURE — G0463 HOSPITAL OUTPT CLINIC VISIT: HCPCS

## 2018-12-01 NOTE — XMS REPORT
Continuity of Care Document (CCD)

 Created on:2018



Patient:Amber Hill

Sex:Female

:1985

External Reference #:[id number]





Demographics







 Address  [address] [place] [address]

 

 Mobile Phone  [phone]

 

 Email Address  [email or web address]

 

 Preferred Language  en

 

 Marital Status  Not  or 

 

 Mandaeism Affiliation  Unknown

 

 Race  White

 

 Ethnic Group  Not  or 









Author







 Name  Adwoa Alvarez









Support







 Name  Relationship  Address  Phone

 

 Austin Hill  Father  Unavailable  [phone]









Care Team Providers







 Name  Role  Phone

 

 Tasia Jalloh MD  Primary Care Physician  Unavailable









Payers







 Type  Date  Identification Numbers  Payment Provider  Subscriber

 

     Policy Number: [id number]  [facility] [employer]  Amber Hill









 PayID: [id number]  [address] [address]









 [place] [address]







Advance Directives







 Description

 

 No Information Available







Problems







 Date  Description  Provider  Status

 

 Onset:   Anxiety    Active

 

 Onset:   Transient cerebral ischemia    Active

 

 Onset:   Patient encounter status    Active

 

 Onset:   Blurring of visual image    Active

 

 Onset:   Obesity    Active

 

 Onset: 2018  Aphasia  Mk Velasquez MD  Active







Family History







 Date  Family Member(s)  Problem(s)  Comments

 

   Father  Hypertension  

 

   Father  Hypercholesterolemia  

 

   Father  Glaucoma  

 

   Father  trigeminal neuralgia  

 

   Mother  Hypercholesterolemia  

 

   Mother  Heart Disease  bad heart valves

 

   Mother  Heart Murmur  

 

   Mother  maternal grandfather-MI  

 

   Mother  maternal grandmother-MI  







Social History







 Type  Date  Description  Comments

 

 Birth Sex    Unknown  

 

 Marital Status    Single  

 

 Lives With    Alone  

 

 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  

 

 ETOH Use    Denies alcohol use  

 

 Tobacco Use  Start: Unknown  Patient is a former  social smoker from



   End: Unknown  smoker  [phone]

 

 Recreational Drug Use    Denies Drug Use  

 

 Smoking Status  Reviewed: 18  Patient is a former  social smoker from



     smoker  [phone]

 

 Exercise Type/Frequency    Exercises regularly  elipitical -4 to 6



       times per week and



       estephania chi







Allergies, Adverse Reactions, Alerts







 Date  Description  Reaction  Status  Severity  Comments

 

 2018  Bactrim  Urticaria  Active    







Medications







 Medication  Date  Status  Form  Strength  Qnty  SIG  Indications  Ordering



                 Provider

 

 Aspirin 81 Low  /  Active  Chewtabs  81mg  30unit  Every Day    Unknown



 Dose  2018        s      

 

 Lipitor  /  Active  Tablets  20mg  30tabs  Every Day    Unknown



   2018              

 

 Restasis  0000/  Active  Emulsion  0.05%    2x daily    Unknown



   0000              

 

 Acetaminophen  00/  Active  Tablets  500mg    2 by    Unknown



   0000          mouth as    



             needed    

 

 Cranberry  /  Active  Capsules      2 by    Unknown



   0000          mouth    



             every day    

 

 Probiotic  /  Active  Capsules      1 by    Unknown



   0000          mouth    



             twice a    



             day    

 

 Betamethasone  /  Active        as    Unknown



 Dipropionate  0000          directed    

 

 Skull Cap  /  Active        liquid 1    Unknown



   0000          dropper    



             1-2 times    



             daily for    



             anxiety    

 

 Celexa  /  Active  Tablets  10mg    1 by    Unknown



   0000          mouth    



             every day    

 

 Magnesium  00/  Active  Tablets  250mg    1 by    Unknown



   0000          mouth    



             every day    

 

 Ibuprofen  /  Active  Tablets  200mg    4 tabs by    Unknown



   0000          mouth as    



             needed    

 

                 

 

 Doxycycline  /  Hx  Tablets  100mg    1 by    Unknown



 Hyclate  0000 -          mouth    



   10/29/          twice a    



   2018          day x 21    



             days (    



             last day    



             taken    



             tonight    



             10/21/18)    

 

 Lotemax  /  Hx  Suspension  0.5%    2x a    Unknown



   0000 -          daily    



   10/29/              



   2018              

 

 Combigan  /  Hx  Solution  0.2-0.5%    2x daily    Unknown



   0000 -              



   10/29/              



   2018              

 

 Nystatin/Triamc  /  Hx  Powder  [id number]    topical    Unknown



 inolone  0000 -      /GM    twice a    



   10/11/          day as    



   2018          needed    

 

 Tylenol 8 Hour  /  Hx  Tablets ER  650mg    1 tab    Unknown



   0000 -          every 6    



   /          hours as    



   2018          needed    



             for pain    

 

 Atorvastatin  /  Hx  Tablets  20mg    take 1    Unknown



 Calcium  0000 -          tablet at    



   /          bedtime    



   2018              







Immunizations







 Description

 

 No Information Available







Vital Signs







 Date  Vital  Result  Comment

 

 2018 10:48am  Height  64.50 inches  5'4.50"









 Weight  303.00 lb  

 

 Heart Rate  64 /min  

 

 BP Systolic Sitting  122 mmHg  LA Large Cuff

 

 BP Diastolic Sitting  90 mmHg  LA Large Cuff

 

 BP Systolic Standing  134 mmHg  LA Large Cuff

 

 BP Diastolic Standing  90 mmHg  LA Large Cuff

 

 Respiratory Rate  16 /min  

 

 Pain Level  0  

 

 O2 % BldC Oximetry  98 %  

 

 BMI (Body Mass Index)  51.2 kg/m2  









 10/30/2018  2:13pm  Height  64.50 inches  5'4.50"









 Weight  303.00 lb  w/ shoes

 

 Heart Rate  74 /min  

 

 BP Systolic Sitting  122 mmHg  lue lg cuff

 

 BP Diastolic Sitting  62 mmHg  lue lg cuff

 

 BMI (Body Mass Index)  51.2 kg/m2  

 

 Ejection Fraction  60-65%  echo 8/24/18









 10/12/2018 11:48am  Height  64.50 inches  5'4.50"









 Weight  305.50 lb  with shoes

 

 Heart Rate  60 /min  

 

 BP Systolic  110 mmHg  rue lg cuff

 

 BP Diastolic  80 mmHg  rue lg cuff

 

 BP Systolic Sitting  114 mmHg  lue lg cuff

 

 BP Diastolic Sitting  80 mmHg  lue lg cuff

 

 BP Systolic Standing  118 mmHg  

 

 BP Diastolic Standing  80 mmHg  

 

 Respiratory Rate  16 /min  

 

 BMI (Body Mass Index)  51.6 kg/m2  









 10/10/2018  8:39am  Height  64.50 inches  5'4.50"









 Weight  306.38 lb  

 

 Heart Rate  58 /min  

 

 BP Systolic Sitting  112 mmHg  

 

 BP Diastolic Sitting  64 mmHg  

 

 Respiratory Rate  14 /min  

 

 Body Temperature  97.7 F  

 

 BMI (Body Mass Index)  51.8 kg/m2  









 2018 10:51am  Height  64.50 inches  5'4.50"









 Weight  311.00 lb  

 

 Heart Rate  60 /min  reular

 

 BP Systolic  120 mmHg  Ra Large Cuff

 

 BP Diastolic  90 mmHg  Ra Large Cuff

 

 BP Systolic Sitting  122 mmHg  LA Large Cuff

 

 BP Diastolic Sitting  84 mmHg  LA Large Cuff

 

 BP Systolic Standing  116 mmHg  LA Large Cuff

 

 BP Diastolic Standing  88 mmHg  LA Large Cuff

 

 Respiratory Rate  20 /min  

 

 Pain Level  4  head/neck/shoulders

 

 O2 % BldC Oximetry  97 %  

 

 BMI (Body Mass Index)  52.6 kg/m2  









 2018 10:04am  Height  66 inches  5'6"









 Weight  311.50 lb  

 

 Heart Rate  76 /min  

 

 BP Systolic  128 mmHg  

 

 BP Diastolic  82 mmHg  

 

 BMI (Body Mass Index)  50.3 kg/m2  







Results







 Description

 

 No Information Available







Procedures







 Date  Code  Description  Status

 

 2018  93148  Echocardiography, Transesophageal, Real Time W/Image 2D  
Completed



     W/W/O M-M  

 

 10/22/2018  95860  Implant Cardiac Loop Recorder  Completed

 

 10/12/2018  54006  EKG Tracing & Interpretation  Completed

 

 2018  22567  EKG Tracing & Interpretation  Completed

 

 2018  45636  Visual Evoked Potential Test CNS  Completed

 

 2018  44327  ECHO Transthorasic Realtime 2D W Doppler & Color Flow Hosp  
Completed

 

 2018  12005  Echocardiography, Transesophageal, Real Time W/Image 2D  
Completed



     W/W/O M-M  







Encounters







 Type  Date  Location  Provider  Dx  Diagnosis

 

 Office Visit  10/12/2018  [facility]  Grey D.  G45.9  Transient 
cerebral



   12:00p  [facility]  SHAWNA Cuellar    ischemic attack,



           unspecified

 

 Office Visit  10/10/2018  [facility]  Chung FISHER  A69.20  Lyme disease,



   8:30a  Infectious  SHAWNA Mendoza    unspecified



     Diseases      

 

 Office Visit  2018  Cardiology  Qutaybeh S.  G45.9  Transient cerebral



   11:00a  [facility] JOHN Arreguin M.D.    ischemic attack,



     [place]      unspecified









 H53.8  Other visual disturbances

 

 R94.31  Abnormal electrocardiogram [ECG] [EKG]

 

 E66.9  Obesity, unspecified









 Office Visit  2018  9:45a  [facility]  Mk Velasquez,  
R47.01  Aphasia



       MD    









 G45.9  Transient cerebral ischemic attack, unspecified

 

 H53.8  Other visual disturbances









 Office Visit  2018  2:06p  [facility]  H53.8  Other visual



     [facility]  ASHLEY Romero    disturbances



     Hospitalists      









 R47.01  Aphasia







Plan of Treatment

Future Appointment(s):2019 11:30 am - Mk Velasquez MD at 
[id number] - Qutaybeh S. Maghaydah, M.D.G45.9 Transient 
cerebral ischemic attack, vljzkjgpgdyI73.9 Obesity, bdaowttkhrbQ43.818 Presence 
of other cardiac implants and graftsFollow up:4 months ov

## 2018-12-01 NOTE — UC
Skin Complaint HPI





- HPI Summary


HPI Summary: 





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 

that started resulting in infalmmed areas. the cient also had bites around the 

same time. the hives have begun to go down except for an area on her right 

lower leg.





- History of Current Complaint


Time Seen by Provider: 12/01/18 17:29


Stated Complaint: RIGHT LEG RASH


Hx Obtained From: Patient


Hx Last Menstrual Period: 8/19/18


Pregnant?: No


Onset/Duration: Sudden Onset, Lasting Days


Skin Exposure Onset/Duration: Days Ago


Timing: Constant


Onset Severity: Mild


Current Severity: Mild


Location: Diffuse


Character: Swelling, Redness


Aggravating Factor(s): Touch


Alleviating Factor(s): Nothing


Associated Signs & Symptoms: Positive: Rash


Related History: Insect Bite/Sting





- Allergy/Home Medications


Allergies/Adverse Reactions: 


 Allergies











Allergy/AdvReac Type Severity Reaction Status Date / Time


 


sulfamethoxazole Allergy  Hives Verified 12/01/18 17:37





[From Bactrim]     


 


trimethoprim [From Bactrim] Allergy  Hives Verified 12/01/18 17:37


 


STEROID EYE DROPS Allergy Unknown CAUSED Uncoded 12/01/18 17:37





   EYEPRESSURE  





   TO GO UP,  





   BLURRED  





   VISION.  














Review of Systems


All Other Systems Reviewed And Are Negative: Yes


Constitutional: Positive: Negative


Skin: Positive: Rash


Eyes: Positive: Negative


ENT: Positive: Negative


Respiratory: Positive: Negative


Cardiovascular: Positive: Negative


Gastrointestinal: Positive: Negative


Genitourinary: Positive: Negative


Motor: Positive: Negative


Neurovascular: Positive: Negative


Musculoskeletal: Positive: Negative


Neurological: Positive: Negative


Is Patient Immunocompromised?: No





PMH/Surg Hx/FS Hx/Imm Hx


Previously Healthy: Yes





- Surgical History


Surgical History: Yes


Surgery Procedure, Year, and Place: Lasik Dec 14, 2017





- Family History


Known Family History: Positive: Diabetes





- Social History


Alcohol Use: None


Substance Use Type: None


Smoking Status (MU): Never Smoked Tobacco





Physical Exam


Triage Information Reviewed: Yes


Appearance: Well-Appearing, No Pain Distress, Well-Nourished


Vital Signs Reviewed: Yes


Eye Exam: Normal


ENT Exam: Normal


Dental Exam: Normal


Neck exam: Normal


Neck: Positive: Supple, Nontender, No Lymphadenopathy


Respiratory Exam: Normal


Respiratory: Positive: Chest non-tender, Lungs clear, Normal breath sounds


Cardiovascular Exam: Normal


Cardiovascular: Positive: RRR, No Murmur, Pulses Normal


Abdominal Exam: Normal


Abdomen Description: Positive: Nontender, No Organomegaly, Soft


Bowel Sounds: Positive: Present


Musculoskeletal Exam: Normal


Neurological Exam: Normal


Psychological Exam: Normal


Skin: Positive: Other - multiple insect bites on arema nd legs, reaised hive 

like area on right lower leg





Course/Dx





- Course


Course Of Treatment: hx obtained, exam performed ,meds reviewed, topical 

steroid given for inflammation from bites





- Differential Diagnoses - Skin Complaint


Differential Diagnoses: Abscess, Cellulitis, Contact Dermatitis, Urticaria





- Diagnoses


Provider Diagnosis: 


 Urticaria, Bed bug bite








Discharge





- Sign-Out/Discharge


Documenting (check all that apply): Patient Departure


All imaging exams completed and their final reports reviewed: Yes





- Discharge Plan


Condition: Stable


Disposition: HOME


Prescriptions: 


Betamethasone Dipropionate 1 applic TOPICAL SEE INSTRUCTIONS #1 cream..g.


Patient Education Materials:  Insect Bite or Sting (ED)


Referrals: 


Tasia Jalloh MD [Primary Care Provider] - 


Additional Instructions: 


1. use the cream as directed


2. Follow up with any worsening symtpoms





- Billing Disposition and Condition


Condition: STABLE


Disposition: Home





- Attestation Statements


Provider Attestation: 





Per institutional requirements, I have reviewed the chart, however, I was not 

consulted specifically or made aware of this patient by the  midlevel provider.

  I did not personally evaluate, interact with , or disposition  this patient.